# Patient Record
(demographics unavailable — no encounter records)

---

## 2025-02-12 NOTE — PLAN
[TextBox_9] : peds neuro. teacher vanderbilts given. parent chandler given for father to fill out. psychotherapy not currently indicated. pt would benefit from support at school; does not have 504 or IEP.  [TextBox_11] :  discussed that upon completion of ADHD evaluation, that medication management can be discussed with neurologist. father would like to explore all options for care prior to making decision.  [TextBox_13] :  no safety concerns at this time. no guns at home. family should call 911 or go to nearest ER or any acute safety concerns. [TextBox_26] : discussed consent to speak with school, but then dad did not sign hipaa. will email father hipaa and contact school if father completes hipaa
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
## 2025-02-12 NOTE — HISTORY OF PRESENT ILLNESS
[FreeTextEntry1] : pt is a 11yo boy, domiciled with his parents and 10yo sister, in 7th grade [employer] in [employer] ed, no significant pmhx, no formal pphx including no h/o inpatient admissions, no SA, no sib, no violence hx, no legal hx, no substance use, no cps involvement, no trauma hx, BIB father, referred by school for possible ADHD.   The patient states that it is really difficult for him to focus in the classroom. inattention worsened when he started middle school. he especially gets distracted when the teacher is talking or writing on the board; less distracted when he is actually completing assignments. his mind will wander about things he wants to do later, but other times he zones out and says that he is not thinking about anything. he will think that he looked away for the board for a minute, but it really has been at least 5 minutes and he missed a portion of  the lesson. he gets easily distracted by peers. he occasionally forgets his water bottle at practice or forgets to bring his pencil to class, but states that it is not often. he says that he is typically organized with his school paperwork and is prepared for practices and games. he does procrastinate with his school work, but denies rushing and making silly mistakes. he does get distracted when doing a task, such as cleaning his room, and will go back to complete things later. he states that he is typically on time and not late. he is also reporting symptoms of hyperactivity and impulsiveness: being unable to sit still, especially in calm or quiet surroundings; fidgeting often; excessive physical movement (finds it difficult to sit in class and even in this office for today's assessment); he used to get in trouble in class in for excessive talking when he was younger. he occasionally has gotten in trouble for silly behaviors at school.    pt denies any significant symptoms of anxiety. he denies excessive worry, PA, social anxiety, performance anxiety in sports, or OCD like symptoms. he reports some worries about his grades, but does not feel they are excessive. pt denies any depressed mood. he states that he is typically "happy." he denies any trouble with his sleep or appetite. he denies ever experiencing any si/i/p, engaging in nssib or having a SA. the pt denies manic symptoms of decreased need for sleep, increased goal directed activity or grandiosity. pt denies psychotic symptoms of avh or paranoid delusions. no hi/i/p. pt denies any substance use. Pt denies history of abuse/trauma. he denies any significant stressors at home with his parents. he describes typical sibling relationship where he and sister argue but do care for each other.   Collateral obtained from Dad by [facility] intern. Attending physician also met with father to discuss assessment and plan.   intern TOBI obtained collateral information from patient's father. Dad provided hx of inattention, impulsivity, and hyperactivity since the patient was in elementary school. Dad reports that the patient has an IQ of 128 but struggles academically due to rushing work and not paying attention in class. Dad said the inattention happens in school and at home but the patient also becomes hyper focused on TV and activities at home. Patient will leave the faucet on, leave lights on, and forget to complete tasks when asked. Dad said the patient is motivated to succeed in sports and school but struggles when the opportunity arrises. No outpatient hx. Dad denies any family hx, PMH, CPS involvement, or legal issues. Dad reports that they are being seen for connection to resources and ADHD evaluation. Dad reports a firearm in the home but it is locked in a closet away from the children.  [FreeTextEntry2] : none  [FreeTextEntry3] : none

## 2025-02-12 NOTE — HISTORY OF PRESENT ILLNESS
[FreeTextEntry1] : pt is a 11yo boy, domiciled with his parents and 8yo sister, in 7th grade [employer] in [employer] ed, no significant pmhx, no formal pphx including no h/o inpatient admissions, no SA, no sib, no violence hx, no legal hx, no substance use, no cps involvement, no trauma hx, BIB father, referred by school for possible ADHD.   The patient states that it is really difficult for him to focus in the classroom. inattention worsened when he started middle school. he especially gets distracted when the teacher is talking or writing on the board; less distracted when he is actually completing assignments. his mind will wander about things he wants to do later, but other times he zones out and says that he is not thinking about anything. he will think that he looked away for the board for a minute, but it really has been at least 5 minutes and he missed a portion of  the lesson. he gets easily distracted by peers. he occasionally forgets his water bottle at practice or forgets to bring his pencil to class, but states that it is not often. he says that he is typically organized with his school paperwork and is prepared for practices and games. he does procrastinate with his school work, but denies rushing and making silly mistakes. he does get distracted when doing a task, such as cleaning his room, and will go back to complete things later. he states that he is typically on time and not late. he is also reporting symptoms of hyperactivity and impulsiveness: being unable to sit still, especially in calm or quiet surroundings; fidgeting often; excessive physical movement (finds it difficult to sit in class and even in this office for today's assessment); he used to get in trouble in class in for excessive talking when he was younger. he occasionally has gotten in trouble for silly behaviors at school.    pt denies any significant symptoms of anxiety. he denies excessive worry, PA, social anxiety, performance anxiety in sports, or OCD like symptoms. he reports some worries about his grades, but does not feel they are excessive. pt denies any depressed mood. he states that he is typically "happy." he denies any trouble with his sleep or appetite. he denies ever experiencing any si/i/p, engaging in nssib or having a SA. the pt denies manic symptoms of decreased need for sleep, increased goal directed activity or grandiosity. pt denies psychotic symptoms of avh or paranoid delusions. no hi/i/p. pt denies any substance use. Pt denies history of abuse/trauma. he denies any significant stressors at home with his parents. he describes typical sibling relationship where he and sister argue but do care for each other.   Collateral obtained from Dad by [facility] intern. Attending physician also met with father to discuss assessment and plan.   intern TOBI obtained collateral information from patient's father. Dad provided hx of inattention, impulsivity, and hyperactivity since the patient was in elementary school. Dad reports that the patient has an IQ of 128 but struggles academically due to rushing work and not paying attention in class. Dad said the inattention happens in school and at home but the patient also becomes hyper focused on TV and activities at home. Patient will leave the faucet on, leave lights on, and forget to complete tasks when asked. Dad said the patient is motivated to succeed in sports and school but struggles when the opportunity arrises. No outpatient hx. Dad denies any family hx, PMH, CPS involvement, or legal issues. Dad reports that they are being seen for connection to resources and ADHD evaluation. Dad reports a firearm in the home but it is locked in a closet away from the children.  [FreeTextEntry2] : none  [FreeTextEntry3] : none

## 2025-02-12 NOTE — DISCUSSION/SUMMARY
[FreeTextEntry1] : Risk factors: male gender,   ADHD like symptoms, not in treatment    Protective factors:   age, domiciled with supportive family, engaged in school, no prior SA or SIB, no current si/i/p, no h/o violence, no current hi/i/p, help-seeking, motivated for outpatient treatment, future oriented with short and long term goals, barriers to suicide, no access to guns,  not acutely manic or psychotic, no substance abuse, no trauma hx, no family history of suicide

## 2025-02-20 NOTE — DISCUSSION/SUMMARY
LOC: The patient is awake, alert, and oriented to place, time, situation. Affect is appropriate.  Speech is appropriate and clear.     APPEARANCE: Patient resting comfortably in no acute distress.  Patient is clean and well groomed.    MUSCULOSKELETAL: pt complaining of left knee pain. Cannot withstand any weight on the affected leg. Slight swelling noted on the left knee.     NEUROLOGIC: Eyes open spontaneously.  Behavior appropriate to situation.  Follows commands; facial expression symmetrical.  Purposeful motor response noted; normal sensation in all extremities.       [FreeTextEntry1] : SW intern TOBI called patient's mom on 2/13/ 25 to discuss outcome of appointment on 2/11/25.

## 2025-02-20 NOTE — DISCUSSION/SUMMARY
[FreeTextEntry1] : SW intern TOBI called patient's mom on 2/13/ 25 to discuss outcome of appointment on 2/11/25.

## 2025-04-07 NOTE — ASSESSMENT
[FreeTextEntry1] : [place] forms: Parent: inattention 7/9, hyperactive 7/9  / avg performance score: 4 +ADHD, combined type : inattention 6/9, hyperactive 4/9   / average performance score: 4 organizational skills ADHD, inattentive type : inattention 8/9, hyperactive 4/9   / average performance score: 4/5 organizational skills ADHD, inattentive type : inattention 1/9, hyperactive 4/9   / average performance score: 4/5 organizational skills, disrupting class and following directions Borderline ADHD, hyperactive  History and [place] consistent with ADHD, inattentive type.

## 2025-04-07 NOTE — PHYSICAL EXAM
[Well-appearing] : well-appearing [Normocephalic] : normocephalic [No dysmorphic facial features] : no dysmorphic facial features [No ocular abnormalities] : no ocular abnormalities [Neck supple] : neck supple [No abnormal neurocutaneous stigmata or skin lesions] : no abnormal neurocutaneous stigmata or skin lesions [Straight] : straight [No meryl or dimples] : no meryl or dimples [No deformities] : no deformities [Alert] : alert [Well related, good eye contact] : well related, good eye contact [Conversant] : conversant [Normal speech and language] : normal speech and language [Follows instructions well] : follows instructions well [VFF] : VFF [Pupils reactive to light and accommodation] : pupils reactive to light and accommodation [Full extraocular movements] : full extraocular movements [No nystagmus] : no nystagmus [No papilledema] : no papilledema [Normal facial sensation to light touch] : normal facial sensation to light touch [No facial asymmetry or weakness] : no facial asymmetry or weakness [Gross hearing intact] : gross hearing intact [Equal palate elevation] : equal palate elevation [Good shoulder shrug] : good shoulder shrug [Normal tongue movement] : normal tongue movement [Midline tongue, no fasciculations] : midline tongue, no fasciculations [Normal axial and appendicular muscle tone] : normal axial and appendicular muscle tone [Gets up on table without difficulty] : gets up on table without difficulty [No pronator drift] : no pronator drift [Normal finger tapping and fine finger movements] : normal finger tapping and fine finger movements [No abnormal involuntary movements] : no abnormal involuntary movements [5/5 strength in proximal and distal muscles of arms and legs] : 5/5 strength in proximal and distal muscles of arms and legs [Walks and runs well] : walks and runs well [Able to do deep knee bend] : able to do deep knee bend [Able to walk on heels] : able to walk on heels [Able to walk on toes] : able to walk on toes [2+ biceps] : 2+ biceps [Triceps] : triceps [Knee jerks] : knee jerks [Ankle jerks] : ankle jerks [No ankle clonus] : no ankle clonus [Localizes LT and temperature] : localizes LT and temperature [No dysmetria on FTNT] : no dysmetria on FTNT [Good walking balance] : good walking balance [Normal gait] : normal gait [Able to tandem well] : able to tandem well [Negative Romberg] : negative Romberg

## 2025-04-07 NOTE — CONSULT LETTER
[Dear  ___] : Dear  [unfilled], [Consult Letter:] : I had the pleasure of evaluating your patient, [unfilled]. [Please see my note below.] : Please see my note below. [Consult Closing:] : Thank you very much for allowing me to participate in the care of this patient.  If you have any questions, please do not hesitate to contact me. [Sincerely,] : Sincerely, [FreeTextEntry3] : Christine Palladino, CPNP Department of Pediatric Neurology [facility]  [address], [address] Tel: [phone] Fax: [phone]

## 2025-04-07 NOTE — PLAN
[FreeTextEntry1] :  [ ]Medication options for ADD/ADHD discussed and the side effect profiles -Will hold off on medication at this time [ ]Letter for school given for recommendation for 504 plan with accommodations [ ]Follow up PRN

## 2025-04-07 NOTE — HISTORY OF PRESENT ILLNESS
[FreeTextEntry1] : Seen by [facility] Feb 2025: The patient states that it is really difficult for him to focus in the classroom. inattention worsened when he started middle school. he especially gets distracted when the teacher is talking or writing on the board; less distracted when he is actually completing assignments. his mind will wander about things he wants to do later, but other times he zones out and says that he is not thinking about anything. he will think that he looked away for the board for a minute, but it really has been at least 5 minutes and he missed a portion of the lesson. he gets easily distracted by peers. he occasionally forgets his water bottle at practice or forgets to bring his pencil to class, but states that it is not often. he says that he is typically organized with his school paperwork and is prepared for practices and games. he does procrastinate with his school work, but denies rushing and making silly mistakes. he does get distracted when doing a task, such as cleaning his room, and will go back to complete things later. he states that he is typically on time and not late. he is also reporting symptoms of hyperactivity and impulsiveness: being unable to sit still, especially in calm or quiet surroundings; fidgeting often; excessive physical movement (finds it difficult to sit in class and even in this office for today's assessment); he used to get in trouble in class in for excessive talking when he was younger. he occasionally has gotten in trouble for silly behaviors at school.  pt denies any significant symptoms of anxiety. he denies excessive worry, PA, social anxiety, performance anxiety in sports, or OCD like symptoms. he reports some worries about his grades, but does not feel they are excessive. pt denies any depressed mood. he states that he is typically "happy." he denies any trouble with his sleep or appetite. he denies ever experiencing any si/i/p, engaging in nssib or having a SA. the pt denies manic symptoms of decreased need for sleep, increased goal directed activity or grandiosity. pt denies psychotic symptoms of avh or paranoid delusions. no hi/i/p. pt denies any substance use. Pt denies history of abuse/trauma. he denies any significant stressors at home with his parents. he describes typical sibling relationship where he and sister argue but do care for each other.  Collateral obtained from Dad by [facility] intern. Attending physician also met with father to discuss assessment and plan.  intern TOBI obtained collateral information from patient's father. Dad provided hx of inattention, impulsivity, and hyperactivity since the patient was in elementary school. Dad reports that the patient has an IQ of 128 but struggles academically due to rushing work and not paying attention in class. Dad said the inattention happens in school and at home but the patient also becomes hyper focused on TV and activities at home. Patient will leave the faucet on, leave lights on, and forget to complete tasks when asked. Dad said the patient is motivated to succeed in sports and school but struggles when the opportunity arrises. No outpatient hx. Dad denies any family hx, PMH, CPS involvement, or legal issues. Dad reports that they are being seen for connection to resources and ADHD evaluation.  -----------------------------------------------------------------------------------------------------------  Now in 7th grade. Father says as far back as he remembers both in school and in sports there has been lack of focus and easily distracted. Father has monitored these symptoms for years but this school year it seemed like the symptoms are starting to really interfere with life. He has made Honor roll for the past 1 1/2 year but father says that his mother has helped significantly to get him to this point.  In sports (basketball, baseball and football) he can tend to be inattentive and miss a skill or "zoned out."  Family hx: none  [place] forms: Parent: inattention 7/9, hyperactive 7/9  / avg performance score: 4 +ADHD, combined type : inattention 6/9, hyperactive 4/9   / average performance score: 4 organizational skills ADHD, inattentive type : inattention 8/9, hyperactive 4/9   / average performance score: 4/5 organizational skills ADHD, inattentive type : inattention 1/9, hyperactive 4/9   / average performance score: 4/5 organizational skills, disrupting class and following directions Borderline ADHD, hyperactive

## 2025-04-07 NOTE — REASON FOR VISIT
[Initial Consultation] : an initial consultation for [Patient] : patient [Father] : father [Medical Records] : medical records